# Patient Record
Sex: FEMALE | Race: WHITE | NOT HISPANIC OR LATINO | Employment: UNEMPLOYED | URBAN - METROPOLITAN AREA
[De-identification: names, ages, dates, MRNs, and addresses within clinical notes are randomized per-mention and may not be internally consistent; named-entity substitution may affect disease eponyms.]

---

## 2017-08-18 DIAGNOSIS — G43.109 MIGRAINE WITH AURA AND WITHOUT STATUS MIGRAINOSUS, NOT INTRACTABLE: ICD-10-CM

## 2017-08-18 RX ORDER — SUMATRIPTAN 100 MG/1
100 TABLET, FILM COATED ORAL
Qty: 9 TAB | Refills: 2 | Status: SHIPPED | OUTPATIENT
Start: 2017-08-18 | End: 2018-10-29 | Stop reason: SDUPTHER

## 2017-09-27 ENCOUNTER — HOSPITAL ENCOUNTER (EMERGENCY)
Facility: MEDICAL CENTER | Age: 37
End: 2017-09-27
Attending: FAMILY MEDICINE
Payer: COMMERCIAL

## 2017-09-27 ENCOUNTER — APPOINTMENT (OUTPATIENT)
Dept: RADIOLOGY | Facility: MEDICAL CENTER | Age: 37
End: 2017-09-27
Attending: EMERGENCY MEDICINE
Payer: COMMERCIAL

## 2017-09-27 ENCOUNTER — HOSPITAL ENCOUNTER (EMERGENCY)
Facility: MEDICAL CENTER | Age: 37
End: 2017-09-27
Attending: EMERGENCY MEDICINE
Payer: COMMERCIAL

## 2017-09-27 VITALS
BODY MASS INDEX: 24.11 KG/M2 | RESPIRATION RATE: 16 BRPM | OXYGEN SATURATION: 98 % | WEIGHT: 150 LBS | HEIGHT: 66 IN | TEMPERATURE: 98.2 F | SYSTOLIC BLOOD PRESSURE: 150 MMHG | DIASTOLIC BLOOD PRESSURE: 80 MMHG | HEART RATE: 73 BPM

## 2017-09-27 DIAGNOSIS — K76.89 LIVER CYST: ICD-10-CM

## 2017-09-27 DIAGNOSIS — S20.219A CONTUSION, CHEST WALL, UNSPECIFIED LATERALITY, INITIAL ENCOUNTER: ICD-10-CM
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
## 2017-09-27 PROCEDURE — 305948 HCHG GREEN TRAUMA ACT PRE-NOTIFY NO CC

## 2017-09-27 PROCEDURE — 71260 CT THORAX DX C+: CPT

## 2017-09-27 PROCEDURE — 99284 EMERGENCY DEPT VISIT MOD MDM: CPT

## 2017-09-27 PROCEDURE — 700117 HCHG RX CONTRAST REV CODE 255: Performed by: EMERGENCY MEDICINE

## 2017-09-27 RX ORDER — CYCLOBENZAPRINE HCL 10 MG
10 TABLET ORAL 3 TIMES DAILY PRN
Qty: 30 TAB | Refills: 0 | Status: SHIPPED | OUTPATIENT
Start: 2017-09-27 | End: 2018-10-29

## 2017-09-27 RX ORDER — SUMATRIPTAN 25 MG/1
25-100 TABLET, FILM COATED ORAL
COMMUNITY
End: 2018-10-29

## 2017-09-27 RX ORDER — HYDROCODONE BITARTRATE AND ACETAMINOPHEN 5; 325 MG/1; MG/1
1 TABLET ORAL EVERY 6 HOURS PRN
Qty: 15 TAB | Refills: 0 | Status: SHIPPED | OUTPATIENT
Start: 2017-09-27 | End: 2018-10-29

## 2017-09-27 RX ADMIN — IOHEXOL 80 ML: 350 INJECTION, SOLUTION INTRAVENOUS at 13:15

## 2017-09-27 ASSESSMENT — PAIN SCALES - GENERAL: PAINLEVEL_OUTOF10: 4

## 2017-09-27 ASSESSMENT — LIFESTYLE VARIABLES: DO YOU DRINK ALCOHOL: NO
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
## 2017-09-28 DIAGNOSIS — K76.89 LIVER CYST: ICD-10-CM

## 2017-09-29 ENCOUNTER — APPOINTMENT (OUTPATIENT)
Dept: RADIOLOGY | Facility: MEDICAL CENTER | Age: 37
End: 2017-09-29
Attending: FAMILY MEDICINE
Payer: COMMERCIAL

## 2017-09-29 PROCEDURE — 76705 ECHO EXAM OF ABDOMEN: CPT

## 2017-10-03 ENCOUNTER — OFFICE VISIT (OUTPATIENT)
Dept: MEDICAL GROUP | Facility: PHYSICIAN GROUP | Age: 37
End: 2017-10-03
Payer: COMMERCIAL

## 2017-10-03 VITALS
OXYGEN SATURATION: 96 % | WEIGHT: 150 LBS | SYSTOLIC BLOOD PRESSURE: 108 MMHG | BODY MASS INDEX: 24.11 KG/M2 | HEIGHT: 66 IN | TEMPERATURE: 97.6 F | HEART RATE: 77 BPM | DIASTOLIC BLOOD PRESSURE: 64 MMHG

## 2017-10-03 DIAGNOSIS — K76.89 BENIGN LIVER CYST: ICD-10-CM
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
## 2017-10-03 DIAGNOSIS — G43.109 MIGRAINE WITH AURA AND WITHOUT STATUS MIGRAINOSUS, NOT INTRACTABLE: ICD-10-CM

## 2017-10-03 DIAGNOSIS — Z23 NEED FOR INFLUENZA VACCINATION: ICD-10-CM

## 2017-10-03 PROCEDURE — 99214 OFFICE O/P EST MOD 30 MIN: CPT | Mod: 25 | Performed by: FAMILY MEDICINE

## 2017-10-03 PROCEDURE — 90686 IIV4 VACC NO PRSV 0.5 ML IM: CPT | Performed by: FAMILY MEDICINE

## 2017-10-03 PROCEDURE — 90471 IMMUNIZATION ADMIN: CPT | Performed by: FAMILY MEDICINE

## 2017-10-03 ASSESSMENT — PATIENT HEALTH QUESTIONNAIRE - PHQ9: CLINICAL INTERPRETATION OF PHQ2 SCORE: 0
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
## 2017-10-03 NOTE — ASSESSMENT & PLAN NOTE
Stable. Patient reports that when she gets regular daily exercise she has less frequent migraines. When she does have a headache, she utilizes sumatriptan and which stops the headache completely.

## 2018-10-29 ENCOUNTER — OFFICE VISIT (OUTPATIENT)
Dept: MEDICAL GROUP | Facility: MEDICAL CENTER | Age: 38
End: 2018-10-29
Payer: COMMERCIAL

## 2018-10-29 VITALS
HEART RATE: 70 BPM | BODY MASS INDEX: 23.88 KG/M2 | WEIGHT: 148.6 LBS | HEIGHT: 66 IN | RESPIRATION RATE: 16 BRPM | OXYGEN SATURATION: 97 % | DIASTOLIC BLOOD PRESSURE: 70 MMHG | SYSTOLIC BLOOD PRESSURE: 118 MMHG | TEMPERATURE: 99.8 F

## 2018-10-29 DIAGNOSIS — Z23 NEED FOR VACCINATION: ICD-10-CM

## 2018-10-29 DIAGNOSIS — N94.6 DYSMENORRHEA: ICD-10-CM

## 2018-10-29 DIAGNOSIS — G43.109 MIGRAINE WITH AURA AND WITHOUT STATUS MIGRAINOSUS, NOT INTRACTABLE: ICD-10-CM

## 2018-10-29 DIAGNOSIS — M54.41 RIGHT-SIDED LOW BACK PAIN WITH RIGHT-SIDED SCIATICA, UNSPECIFIED CHRONICITY: ICD-10-CM

## 2018-10-29 PROBLEM — K76.89 BENIGN LIVER CYST: Status: RESOLVED | Noted: 2017-10-03 | Resolved: 2018-10-29
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
## 2018-10-29 PROCEDURE — 90471 IMMUNIZATION ADMIN: CPT | Performed by: FAMILY MEDICINE

## 2018-10-29 PROCEDURE — 99213 OFFICE O/P EST LOW 20 MIN: CPT | Mod: 25 | Performed by: FAMILY MEDICINE

## 2018-10-29 PROCEDURE — 90686 IIV4 VACC NO PRSV 0.5 ML IM: CPT | Performed by: FAMILY MEDICINE

## 2018-10-29 RX ORDER — SUMATRIPTAN 100 MG/1
100 TABLET, FILM COATED ORAL
Qty: 10 TAB | Refills: 11 | Status: SHIPPED | OUTPATIENT
Start: 2018-10-29 | End: 2022-03-15 | Stop reason: SDUPTHER

## 2018-10-29 ASSESSMENT — PATIENT HEALTH QUESTIONNAIRE - PHQ9: CLINICAL INTERPRETATION OF PHQ2 SCORE: 0

## 2018-10-30 NOTE — PROGRESS NOTES
This medical record contains text that has been entered with the assistance of computer voice recognition and dictation software.  Therefore, it may contain unintended errors in text, spelling, punctuation, or grammar        Chief Complaint   Patient presents with   • Establish Care     new patient       Bernadette Juárez is a 38 y.o. female here evaluation and management of: est care migraines dysmenorrhea chronic low back pain     She is here with her two daughters 6th grade and 4th grade we will be meeting them on Wednesday   She is overall very healthy   Works part time at her [employer]   They live in [facility]  She has dysmenorrhea well controlled on mirena IUD         Current Outpatient Prescriptions   Medication Sig Dispense Refill   • sumatriptan (IMITREX) 100 MG tablet Take 1 Tab by mouth Once PRN for Migraine for up to 1 dose. 10 Tab 11   • Levonorgestrel (MIRENA IU) by Intrauterine route.     • ibuprofen (MOTRIN) 200 MG TABS Take 200 mg by mouth every 6 hours as needed.       • MELATONIN by Does not apply route.         No current facility-administered medications for this visit.      Patient Active Problem List    Diagnosis Date Noted   • Right-sided low back pain with right-sided sciatica 2016   • Bilateral low back pain without sciatica 2016   • Migraine with aura and without status migrainosus, not intractable    • Acne    • Dysmenorrhea    • Cervical high risk HPV (human papillomavirus) test positive      No past surgical history on file.   Social History   Substance Use Topics   • Smoking status: Never Smoker   • Smokeless tobacco: Never Used   • Alcohol use 0.0 oz/week      Comment: 1 drink about once a month     Family History   Problem Relation Age of Onset   • Thyroid Mother    • Psychiatry Mother         depression   • Cancer Maternal Grandmother         colon   • Heart Disease Other         Cousin  of MI at age 33           ROS  No n/v  all review of system completed  "and negative except for those listed above     Objective:     Blood pressure 118/70, pulse 70, temperature 37.7 °C (99.8 °F), temperature source Temporal, resp. rate 16, height 1.685 m (5' 6.34\"), weight 67.4 kg (148 lb 9.6 oz), SpO2 97 %, not currently breastfeeding. Body mass index is 23.74 kg/m².  Physical Exam:        GEN: comfortable, alert and oriented, well nourished, well developed, in no apparent distress   HEENT: NCAT, eyes: pupils equal and reactive, sclera white, EOMIT, good dentition  HEART: limbs warm and well perfused, regular rate, no JVD, no lower extremity edema  LUNGS: speaking in full sentences, not in apparent respiratory distress, no audible wheezes  MSK: normal tone and bulk, no swelling of the joints, gait steady and normal         Assessment and Plan:   The following treatment plan was discussed        Problem List Items Addressed This Visit     Migraine with aura and without status migrainosus, not intractable     Takes imitrex abortive      Has very infrequent migraines             Relevant Medications    sumatriptan (IMITREX) 100 MG tablet    Other Relevant Orders    Influenza Vaccine Quad Injection >3Y (PF)    Dysmenorrhea     Has mirena   Seeing ob   Due in march to have another one placed             Right-sided low back pain with right-sided sciatica     She is doing well with a regular stretch and strength program to prevent flairs                 Other Visit Diagnoses     Need for vaccination                    Instructed to follow up if symptoms worsen or fail to improve, ER/UC precautions discussed as well    Nikki Atwood MD  [facility], Family Medicine   [address] [place] [address]   [place] [address]  Phone: [phone]             "

## 2019-06-06 ENCOUNTER — HOSPITAL ENCOUNTER (OUTPATIENT)
Dept: RADIOLOGY | Facility: MEDICAL CENTER | Age: 39
End: 2019-06-06
Attending: FAMILY MEDICINE
Payer: COMMERCIAL

## 2019-06-06 ENCOUNTER — OFFICE VISIT (OUTPATIENT)
Dept: URGENT CARE | Facility: PHYSICIAN GROUP | Age: 39
End: 2019-06-06
Payer: COMMERCIAL

## 2019-06-06 VITALS
SYSTOLIC BLOOD PRESSURE: 150 MMHG | DIASTOLIC BLOOD PRESSURE: 88 MMHG | HEIGHT: 66 IN | WEIGHT: 155 LBS | RESPIRATION RATE: 16 BRPM | HEART RATE: 110 BPM | TEMPERATURE: 99.6 F | OXYGEN SATURATION: 94 % | BODY MASS INDEX: 24.91 KG/M2

## 2019-06-06 DIAGNOSIS — R05.9 COUGH: ICD-10-CM

## 2019-06-06 DIAGNOSIS — J18.9 PNEUMONIA OF LEFT LOWER LOBE DUE TO INFECTIOUS ORGANISM: ICD-10-CM

## 2019-06-06 PROCEDURE — 71046 X-RAY EXAM CHEST 2 VIEWS: CPT

## 2019-06-06 PROCEDURE — 99214 OFFICE O/P EST MOD 30 MIN: CPT | Performed by: FAMILY MEDICINE

## 2019-06-06 RX ORDER — DOXYCYCLINE HYCLATE 100 MG
100 TABLET ORAL 2 TIMES DAILY
Qty: 20 TAB | Refills: 0 | Status: SHIPPED | OUTPATIENT
Start: 2019-06-06 | End: 2019-06-16

## 2019-06-06 RX ORDER — BENZONATATE 200 MG/1
200 CAPSULE ORAL 3 TIMES DAILY PRN
Qty: 30 CAP | Refills: 0 | Status: SHIPPED | OUTPATIENT
Start: 2019-06-06 | End: 2022-03-15

## 2019-06-06 ASSESSMENT — ENCOUNTER SYMPTOMS
WEIGHT LOSS: 0
EYE REDNESS: 0
EYE DISCHARGE: 0
MYALGIAS: 0
SORE THROAT: 1
DIAPHORESIS: 1

## 2019-06-06 NOTE — PROGRESS NOTES
"Subjective:      Bernadette Juárez is a 38 y.o. female who presents with Cough (cough x2 wks, fever )            2 weeks productive cough without blood in sputum. Sinus pressure and drainage. Denies fever/chills. SOB with exertion. Wheeze with cough. No PMH asthma/pneumonia. OTC cold and flu meds with some improvement of severe sx's. No other aggravating or alleviating factors.          Review of Systems   Constitutional: Positive for diaphoresis (at night) and malaise/fatigue. Negative for weight loss.   HENT: Positive for sore throat (due to cough ). Negative for ear discharge and ear pain.    Eyes: Negative for discharge and redness.   Musculoskeletal: Negative for joint pain and myalgias.   Skin: Negative for itching and rash.     .  Medications, Allergies, and current problem list reviewed today in Epic       Objective:     /88 (BP Location: Right arm, Patient Position: Sitting, BP Cuff Size: Small adult)   Pulse (!) 110   Temp 37.6 °C (99.6 °F) (Temporal)   Resp 16   Ht 1.676 m (5' 6\")   Wt 70.3 kg (155 lb)   SpO2 94%   BMI 25.02 kg/m²      Physical Exam   Constitutional: She is oriented to person, place, and time. She appears well-developed and well-nourished. No distress.   HENT:   Head: Normocephalic and atraumatic.   Eyes: Conjunctivae are normal.   Cardiovascular: Normal rate, regular rhythm and normal heart sounds.    Pulmonary/Chest: Effort normal. She has rales (left).   Neurological: She is alert and oriented to person, place, and time.   Skin: Skin is warm and dry. No rash noted.               Assessment/Plan:   Cxr: LLL pneumonia per rad    1. Cough  DX-CHEST-2 VIEWS    benzonatate (TESSALON) 200 MG capsule   2. Pneumonia of left lower lobe due to infectious organism (HCC)  doxycycline (VIBRAMYCIN) 100 MG Tab     Differential diagnosis, natural history, supportive care, and indications for immediate follow-up discussed at length.       "

## 2021-04-20 ENCOUNTER — IMMUNIZATION (OUTPATIENT)
Dept: FAMILY PLANNING/WOMEN'S HEALTH CLINIC | Facility: IMMUNIZATION CENTER | Age: 41
End: 2021-04-20
Payer: COMMERCIAL

## 2021-04-20 DIAGNOSIS — Z23 ENCOUNTER FOR VACCINATION: Primary | ICD-10-CM

## 2021-04-20 PROCEDURE — 0001A PFIZER SARS-COV-2 VACCINE: CPT

## 2021-04-20 PROCEDURE — 91300 PFIZER SARS-COV-2 VACCINE: CPT

## 2021-05-14 ENCOUNTER — IMMUNIZATION (OUTPATIENT)
Dept: FAMILY PLANNING/WOMEN'S HEALTH CLINIC | Facility: IMMUNIZATION CENTER | Age: 41
End: 2021-05-14
Payer: COMMERCIAL

## 2021-05-14 DIAGNOSIS — Z23 ENCOUNTER FOR VACCINATION: Primary | ICD-10-CM

## 2021-05-14 PROCEDURE — 91300 PFIZER SARS-COV-2 VACCINE: CPT | Performed by: INTERNAL MEDICINE

## 2021-05-14 PROCEDURE — 0002A PFIZER SARS-COV-2 VACCINE: CPT | Performed by: INTERNAL MEDICINE

## 2021-08-12 ENCOUNTER — HOSPITAL ENCOUNTER (OUTPATIENT)
Dept: RADIOLOGY | Facility: MEDICAL CENTER | Age: 41
End: 2021-08-12
Attending: OBSTETRICS & GYNECOLOGY
Payer: COMMERCIAL

## 2021-08-12 DIAGNOSIS — Z12.31 SCREENING MAMMOGRAM, ENCOUNTER FOR: ICD-10-CM

## 2021-08-12 PROCEDURE — 77063 BREAST TOMOSYNTHESIS BI: CPT

## 2022-03-15 ENCOUNTER — OFFICE VISIT (OUTPATIENT)
Dept: MEDICAL GROUP | Facility: PHYSICIAN GROUP | Age: 42
End: 2022-03-15
Payer: COMMERCIAL

## 2022-03-15 VITALS
HEART RATE: 66 BPM | RESPIRATION RATE: 18 BRPM | WEIGHT: 171.3 LBS | DIASTOLIC BLOOD PRESSURE: 70 MMHG | BODY MASS INDEX: 26.89 KG/M2 | TEMPERATURE: 98.7 F | HEIGHT: 67 IN | SYSTOLIC BLOOD PRESSURE: 118 MMHG | OXYGEN SATURATION: 100 %

## 2022-03-15 DIAGNOSIS — Z00.00 WELLNESS EXAMINATION: ICD-10-CM

## 2022-03-15 DIAGNOSIS — E55.9 VITAMIN D DEFICIENCY: ICD-10-CM

## 2022-03-15 DIAGNOSIS — G43.109 MIGRAINE WITH AURA AND WITHOUT STATUS MIGRAINOSUS, NOT INTRACTABLE: ICD-10-CM

## 2022-03-15 DIAGNOSIS — R87.810 CERVICAL HIGH RISK HUMAN PAPILLOMAVIRUS (HPV) DNA TEST POSITIVE: ICD-10-CM

## 2022-03-15 DIAGNOSIS — D22.9 NUMEROUS SKIN MOLES: ICD-10-CM

## 2022-03-15 PROCEDURE — 99214 OFFICE O/P EST MOD 30 MIN: CPT | Performed by: FAMILY MEDICINE

## 2022-03-15 RX ORDER — SUMATRIPTAN 100 MG/1
TABLET, FILM COATED ORAL
Qty: 10 TABLET | Refills: 4 | Status: SHIPPED | OUTPATIENT
Start: 2022-03-15

## 2022-03-15 ASSESSMENT — PATIENT HEALTH QUESTIONNAIRE - PHQ9: CLINICAL INTERPRETATION OF PHQ2 SCORE: 0

## 2022-03-15 NOTE — PROGRESS NOTES
Subjective:     CC:   Chief Complaint   Patient presents with   • Establish Care   • Migraine   • Referral Needed     dermatology       HPI:   Bernadette presents today to establish care.  Patient needs refills on her Imitrex patient is only getting the migraine maybe once every 2 months.  Patient is seeing GYN for female exam she does have a history of abnormal Pap smear in the past.  Patient also has a IUD in that helped with her heavy periods.  Patient would also like a Derm referral she has a few moles on her back that she feels has been changing the last couple of months.    Past Medical History:   Diagnosis Date   • Acne    • Cervical high risk HPV (human papillomavirus) test positive 2001    abnormal pap with negative colpo   • Dysmenorrhea    • Migraine    • Migraines        Social History     Tobacco Use   • Smoking status: Never Smoker   • Smokeless tobacco: Never Used   Vaping Use   • Vaping Use: Never used   Substance Use Topics   • Alcohol use: Yes     Alcohol/week: 0.0 oz     Comment: 1 drink about once a month   • Drug use: No       Current Outpatient Medications Ordered in Epic   Medication Sig Dispense Refill   • sumatriptan (IMITREX) 100 MG tablet Take 1 Tab by mouth Once PRN for Migraine for up to 1 dose. 10 Tab 11   • ibuprofen (MOTRIN) 200 MG TABS Take 200 mg by mouth every 6 hours as needed.       • MELATONIN       • Levonorgestrel (MIRENA IU) by Intrauterine route.       No current [facility]-ordered facility-administered medications on file.       Allergies:  Nkda [no known drug allergy]    Health Maintenance: Completed    ROS:  Gen: no fevers/chills, no changes in weight  Eyes: no changes in vision  ENT: no sore throat, no hearing loss, no bloody nose  Pulm: no sob, no cough  CV: no chest pain, no palpitations  GI: no nausea/vomiting, no diarrhea  : no dysuria  Skin: no rash  Neuro: no headaches, no numbness/tingling  Heme/Lymph: no easy bruising    Objective:     Exam:  /70 (BP Location: Left  "arm, Patient Position: Sitting, BP Cuff Size: Adult)   Pulse 66   Temp 37.1 °C (98.7 °F) (Temporal)   Resp 18   Ht 1.702 m (5' 7\")   Wt 77.7 kg (171 lb 4.8 oz)   SpO2 100%   BMI 26.83 kg/m²  Body mass index is 26.83 kg/m².    Gen: Alert and oriented, No apparent distress.  Skin: Warm and dry.  Patient does have a scar from a mole that was removed when she was a child there is increased pigmentation on that scar noted patient also above that have about 3 pigmented moles that are circular and uniform in color.  Patient's concerned about those 3.  Eyes: Sclera wnl Pupils normal in size  Lungs: Normal effort, CTA bilaterally, no wheezes, rhonchi, or rales  CV: Regular rate and rhythm. No murmurs, rubs, or gallops.  ABD: Soft non-tender no organomegaly  Musculoskeletal: Normal gait. No extremity cyanosis, clubbing, or edema.  Neuro: Oriented to person, place and time  Psych: Mood is wnl       Labs: Fasting labs were ordered patient given a listing of all the [facility] labs    Assessment & Plan:     41 y.o. female with the following -     1. Migraine with aura and without status migrainosus, not intractable  Patient states her migraines have decreased in frequency since she  her  about a year ago.  We will refill her migraine medication patient follow-up with me if her headaches increase this is a chronic problem  2. Cervical high risk HPV (human papillomavirus) test positive  Patient is seeing GYN for her Pap smears due to the fact that she had a abnormal Pap in the past this is a chronic problem    3. Wellness examination  Will get some wellness labs done on patient  - CBC WITH DIFFERENTIAL; Future  - Comp Metabolic Panel; Future  - Lipid Profile; Future    4. Vitamin D deficiency  Last vitamin D level was low this is a chronic problem  - VITAMIN D,25 HYDROXY; Future    5. Numerous skin moles  I did write referral to dermatology did recommend it may take a little while she can always seek one of the " outside Derm clinics and if she needs a referral to let me know this is a chronic problem  - Referral to Dermatology       Return if symptoms worsen or fail to improve.    Please note that this dictation was created using voice recognition software. I have made every reasonable attempt to correct obvious errors, but I expect that there are errors of grammar and possibly content that I did not discover before finalizing the note.

## 2022-03-21 ENCOUNTER — HOSPITAL ENCOUNTER (OUTPATIENT)
Dept: LAB | Facility: MEDICAL CENTER | Age: 42
End: 2022-03-21
Attending: FAMILY MEDICINE
Payer: COMMERCIAL

## 2022-03-21 DIAGNOSIS — Z00.00 WELLNESS EXAMINATION: ICD-10-CM

## 2022-03-21 DIAGNOSIS — E55.9 VITAMIN D DEFICIENCY: ICD-10-CM

## 2022-03-21 LAB
25(OH)D3 SERPL-MCNC: 29 NG/ML (ref 30–100)
ALBUMIN SERPL BCP-MCNC: 4.4 G/DL (ref 3.2–4.9)
ALBUMIN/GLOB SERPL: 2.2 G/DL
ALP SERPL-CCNC: 57 U/L (ref 30–99)
ALT SERPL-CCNC: 17 U/L (ref 2–50)
ANION GAP SERPL CALC-SCNC: 7 MMOL/L (ref 7–16)
AST SERPL-CCNC: 20 U/L (ref 12–45)
BASOPHILS # BLD AUTO: 1.1 % (ref 0–1.8)
BASOPHILS # BLD: 0.06 K/UL (ref 0–0.12)
BILIRUB SERPL-MCNC: 0.3 MG/DL (ref 0.1–1.5)
BUN SERPL-MCNC: 14 MG/DL (ref 8–22)
CALCIUM SERPL-MCNC: 9.4 MG/DL (ref 8.5–10.5)
CHLORIDE SERPL-SCNC: 107 MMOL/L (ref 96–112)
CHOLEST SERPL-MCNC: 153 MG/DL (ref 100–199)
CO2 SERPL-SCNC: 26 MMOL/L (ref 20–33)
CREAT SERPL-MCNC: 0.79 MG/DL (ref 0.5–1.4)
EOSINOPHIL # BLD AUTO: 0.36 K/UL (ref 0–0.51)
EOSINOPHIL NFR BLD: 6.8 % (ref 0–6.9)
ERYTHROCYTE [DISTWIDTH] IN BLOOD BY AUTOMATED COUNT: 48.2 FL (ref 35.9–50)
FASTING STATUS PATIENT QL REPORTED: NORMAL
GFR SERPLBLD CREATININE-BSD FMLA CKD-EPI: 95 ML/MIN/1.73 M 2
GLOBULIN SER CALC-MCNC: 2 G/DL (ref 1.9–3.5)
GLUCOSE SERPL-MCNC: 94 MG/DL (ref 65–99)
HCT VFR BLD AUTO: 43.1 % (ref 37–47)
HDLC SERPL-MCNC: 63 MG/DL
HGB BLD-MCNC: 13.7 G/DL (ref 12–16)
IMM GRANULOCYTES # BLD AUTO: 0.01 K/UL (ref 0–0.11)
IMM GRANULOCYTES NFR BLD AUTO: 0.2 % (ref 0–0.9)
LDLC SERPL CALC-MCNC: 77 MG/DL
LYMPHOCYTES # BLD AUTO: 2.06 K/UL (ref 1–4.8)
LYMPHOCYTES NFR BLD: 38.9 % (ref 22–41)
MCH RBC QN AUTO: 27.8 PG (ref 27–33)
MCHC RBC AUTO-ENTMCNC: 31.8 G/DL (ref 33.6–35)
MCV RBC AUTO: 87.4 FL (ref 81.4–97.8)
MONOCYTES # BLD AUTO: 0.64 K/UL (ref 0–0.85)
MONOCYTES NFR BLD AUTO: 12.1 % (ref 0–13.4)
NEUTROPHILS # BLD AUTO: 2.17 K/UL (ref 2–7.15)
NEUTROPHILS NFR BLD: 40.9 % (ref 44–72)
NRBC # BLD AUTO: 0 K/UL
NRBC BLD-RTO: 0 /100 WBC
PLATELET # BLD AUTO: 264 K/UL (ref 164–446)
PMV BLD AUTO: 10.6 FL (ref 9–12.9)
POTASSIUM SERPL-SCNC: 5 MMOL/L (ref 3.6–5.5)
PROT SERPL-MCNC: 6.4 G/DL (ref 6–8.2)
RBC # BLD AUTO: 4.93 M/UL (ref 4.2–5.4)
SODIUM SERPL-SCNC: 140 MMOL/L (ref 135–145)
TRIGL SERPL-MCNC: 63 MG/DL (ref 0–149)
WBC # BLD AUTO: 5.3 K/UL (ref 4.8–10.8)

## 2022-03-21 PROCEDURE — 80061 LIPID PANEL: CPT

## 2022-03-21 PROCEDURE — 85025 COMPLETE CBC W/AUTO DIFF WBC: CPT

## 2022-03-21 PROCEDURE — 80053 COMPREHEN METABOLIC PANEL: CPT

## 2022-03-21 PROCEDURE — 36415 COLL VENOUS BLD VENIPUNCTURE: CPT

## 2022-03-21 PROCEDURE — 82306 VITAMIN D 25 HYDROXY: CPT

## 2023-08-01 ENCOUNTER — HOSPITAL ENCOUNTER (OUTPATIENT)
Dept: RADIOLOGY | Facility: MEDICAL CENTER | Age: 43
End: 2023-08-01
Attending: OBSTETRICS & GYNECOLOGY
Payer: COMMERCIAL

## 2023-08-01 DIAGNOSIS — Z12.31 SCREENING MAMMOGRAM, ENCOUNTER FOR: ICD-10-CM

## 2023-08-01 PROCEDURE — 77063 BREAST TOMOSYNTHESIS BI: CPT

## 2023-11-10 ENCOUNTER — OFFICE VISIT (OUTPATIENT)
Dept: MEDICAL GROUP | Facility: PHYSICIAN GROUP | Age: 43
End: 2023-11-10
Payer: COMMERCIAL

## 2023-11-10 VITALS
HEIGHT: 66 IN | SYSTOLIC BLOOD PRESSURE: 112 MMHG | OXYGEN SATURATION: 99 % | HEART RATE: 73 BPM | TEMPERATURE: 98.4 F | DIASTOLIC BLOOD PRESSURE: 74 MMHG | WEIGHT: 182.2 LBS | RESPIRATION RATE: 16 BRPM | BODY MASS INDEX: 29.28 KG/M2

## 2023-11-10 DIAGNOSIS — Z23 NEED FOR VACCINATION: ICD-10-CM

## 2023-11-10 DIAGNOSIS — Z00.00 WELLNESS EXAMINATION: ICD-10-CM

## 2023-11-10 DIAGNOSIS — E55.9 VITAMIN D DEFICIENCY: ICD-10-CM

## 2023-11-10 DIAGNOSIS — R63.5 WEIGHT GAIN: ICD-10-CM

## 2023-11-10 DIAGNOSIS — M79.671 RIGHT FOOT PAIN: ICD-10-CM

## 2023-11-10 PROCEDURE — 90686 IIV4 VACC NO PRSV 0.5 ML IM: CPT | Performed by: FAMILY MEDICINE

## 2023-11-10 PROCEDURE — 90471 IMMUNIZATION ADMIN: CPT | Performed by: FAMILY MEDICINE

## 2023-11-10 PROCEDURE — 99214 OFFICE O/P EST MOD 30 MIN: CPT | Mod: 25 | Performed by: FAMILY MEDICINE

## 2023-11-10 PROCEDURE — 3078F DIAST BP <80 MM HG: CPT | Performed by: FAMILY MEDICINE

## 2023-11-10 PROCEDURE — 3074F SYST BP LT 130 MM HG: CPT | Performed by: FAMILY MEDICINE

## 2023-11-10 ASSESSMENT — FIBROSIS 4 INDEX: FIB4 SCORE: 0.81

## 2023-11-10 ASSESSMENT — PATIENT HEALTH QUESTIONNAIRE - PHQ9: CLINICAL INTERPRETATION OF PHQ2 SCORE: 0

## 2023-11-10 NOTE — PROGRESS NOTES
Subjective:     CC:   Chief Complaint   Patient presents with    Follow-Up       HPI:   Bernadette presents today for follow-up.  I have not seen patient since March 2022.  Patient is going to make a follow-up appointment with her GYN provider she needs her Mirena replaced and also she is due for a Pap smear.  Patient states she is doing well other than she is noted some right foot pain she denies any injury to her foot.  In certain positions she is noticing some discomfort.    Past Medical History:   Diagnosis Date    Acne     Cervical high risk HPV (human papillomavirus) test positive 2001    abnormal pap with negative colpo    Dysmenorrhea     Migraine     Migraines        Social History     Tobacco Use    Smoking status: Never    Smokeless tobacco: Never   Vaping Use    Vaping Use: Never used   Substance Use Topics    Alcohol use: Yes     Alcohol/week: 0.0 oz     Comment: 1 drink about once a month    Drug use: No       Current Outpatient Medications Ordered in Epic   Medication Sig Dispense Refill    sumatriptan (IMITREX) 100 MG tablet Take 1 tablet p.o. when migraine occurs no more than 1 tablet in 1 day 10 Tablet 4    ibuprofen (MOTRIN) 200 MG TABS Take 200 mg by mouth every 6 hours as needed.        Levonorgestrel (MIRENA IU) by Intrauterine route.       No current [facility]-ordered facility-administered medications on file.       Allergies:  Nkda [no known drug allergy]    Health Maintenance: Completed    ROS:  Gen: no fevers/chills, patient has gained about 11 pounds in over a year  Eyes: no changes in vision  ENT: no sore throat, no hearing loss, no bloody nose  Pulm: no sob, no cough  CV: no chest pain, no palpitations  GI: no nausea/vomiting, no diarrhea  : no dysuria  Neuro: no headaches, no numbness/tingling  Heme/Lymph: no easy bruising    Objective:     Exam:  /74 (BP Location: Left arm, Patient Position: Sitting, BP Cuff Size: Adult)   Pulse 73   Temp 36.9 °C (98.4 °F) (Temporal)   Resp 16   Ht  "1.676 m (5' 6\")   Wt 82.6 kg (182 lb 3.2 oz)   SpO2 99%   BMI 29.41 kg/m²  Body mass index is 29.41 kg/m².    Gen: Alert and oriented, No apparent distress.  Skin: Warm and dry.  No obvious lesions.  Eyes: Sclera wnl Pupils normal in size  Lungs: Normal effort, CTA bilaterally, no wheezes, rhonchi, or rales  CV: Regular rate and rhythm. No murmurs, rubs, or gallops.  Musculoskeletal: Normal gait. No extremity cyanosis, clubbing, or edema.  On examination of right foot there is no swelling noted no tenderness on palpation  Neuro: Oriented to person, place and time  Psych: Mood is wnl     Labs: Fasting labs were ordered patient given a listing of all the [facility] labs    Assessment & Plan:     43 y.o. female with the following -     1. Weight gain  I recommend checking her thyroid patient agreeable with the plan  - TSH; Future    2. Right foot pain  Recommend referring patient to podiatry and also doing lab work to rule out rheumatoid and gout.  Patient agreeable with this  - Referral to Podiatry  - CBC WITH DIFFERENTIAL; Future  - CRP QUANTITIVE (NON-CARDIAC); Future  - Sed Rate; Future  - RHEUMATOID ARTHRITIS FACTOR; Future  - URIC ACID; Future    3. Need for vaccination  - INFLUENZA VACCINE QUAD INJ (PF)    4. Wellness examination  - Comp Metabolic Panel; Future  - Lipid Profile; Future    5. Vitamin D deficiency  - VITAMIN D,25 HYDROXY (DEFICIENCY); Future       Return in about 2 months (around 1/10/2024), or if symptoms worsen or fail to improve.    Please note that this dictation was created using voice recognition software. I have made every reasonable attempt to correct obvious errors, but I expect that there are errors of grammar and possibly content that I did not discover before finalizing the note.  "

## 2023-12-26 ENCOUNTER — APPOINTMENT (OUTPATIENT)
Dept: LAB | Facility: MEDICAL CENTER | Age: 43
End: 2023-12-26
Attending: FAMILY MEDICINE
Payer: COMMERCIAL

## 2023-12-27 ENCOUNTER — HOSPITAL ENCOUNTER (OUTPATIENT)
Dept: LAB | Facility: MEDICAL CENTER | Age: 43
End: 2023-12-27
Attending: FAMILY MEDICINE
Payer: COMMERCIAL

## 2023-12-27 DIAGNOSIS — R63.5 WEIGHT GAIN: ICD-10-CM

## 2023-12-27 DIAGNOSIS — M79.671 RIGHT FOOT PAIN: ICD-10-CM

## 2023-12-27 DIAGNOSIS — E55.9 VITAMIN D DEFICIENCY: ICD-10-CM

## 2023-12-27 DIAGNOSIS — Z00.00 WELLNESS EXAMINATION: ICD-10-CM

## 2023-12-27 LAB
ALBUMIN SERPL BCP-MCNC: 4.4 G/DL (ref 3.2–4.9)
ALBUMIN/GLOB SERPL: 1.8 G/DL
ALP SERPL-CCNC: 53 U/L (ref 30–99)
ALT SERPL-CCNC: 18 U/L (ref 2–50)
ANION GAP SERPL CALC-SCNC: 10 MMOL/L (ref 7–16)
AST SERPL-CCNC: 16 U/L (ref 12–45)
BASOPHILS # BLD AUTO: 0.8 % (ref 0–1.8)
BASOPHILS # BLD: 0.05 K/UL (ref 0–0.12)
BILIRUB SERPL-MCNC: 0.5 MG/DL (ref 0.1–1.5)
BUN SERPL-MCNC: 14 MG/DL (ref 8–22)
CALCIUM ALBUM COR SERPL-MCNC: 8.7 MG/DL (ref 8.5–10.5)
CALCIUM SERPL-MCNC: 9 MG/DL (ref 8.5–10.5)
CHLORIDE SERPL-SCNC: 105 MMOL/L (ref 96–112)
CHOLEST SERPL-MCNC: 168 MG/DL (ref 100–199)
CO2 SERPL-SCNC: 23 MMOL/L (ref 20–33)
CREAT SERPL-MCNC: 0.77 MG/DL (ref 0.5–1.4)
CRP SERPL HS-MCNC: <0.3 MG/DL (ref 0–0.75)
EOSINOPHIL # BLD AUTO: 0.22 K/UL (ref 0–0.51)
EOSINOPHIL NFR BLD: 3.7 % (ref 0–6.9)
ERYTHROCYTE [DISTWIDTH] IN BLOOD BY AUTOMATED COUNT: 42.5 FL (ref 35.9–50)
ERYTHROCYTE [SEDIMENTATION RATE] IN BLOOD BY WESTERGREN METHOD: 2 MM/HOUR (ref 0–25)
FASTING STATUS PATIENT QL REPORTED: NORMAL
GFR SERPLBLD CREATININE-BSD FMLA CKD-EPI: 97 ML/MIN/1.73 M 2
GLOBULIN SER CALC-MCNC: 2.4 G/DL (ref 1.9–3.5)
GLUCOSE SERPL-MCNC: 89 MG/DL (ref 65–99)
HCT VFR BLD AUTO: 45.1 % (ref 37–47)
HDLC SERPL-MCNC: 64 MG/DL
HGB BLD-MCNC: 15.2 G/DL (ref 12–16)
IMM GRANULOCYTES # BLD AUTO: 0.05 K/UL (ref 0–0.11)
IMM GRANULOCYTES NFR BLD AUTO: 0.8 % (ref 0–0.9)
LDLC SERPL CALC-MCNC: 91 MG/DL
LYMPHOCYTES # BLD AUTO: 1.76 K/UL (ref 1–4.8)
LYMPHOCYTES NFR BLD: 29.6 % (ref 22–41)
MCH RBC QN AUTO: 30.2 PG (ref 27–33)
MCHC RBC AUTO-ENTMCNC: 33.7 G/DL (ref 32.2–35.5)
MCV RBC AUTO: 89.7 FL (ref 81.4–97.8)
MONOCYTES # BLD AUTO: 0.58 K/UL (ref 0–0.85)
MONOCYTES NFR BLD AUTO: 9.8 % (ref 0–13.4)
NEUTROPHILS # BLD AUTO: 3.28 K/UL (ref 1.82–7.42)
NEUTROPHILS NFR BLD: 55.3 % (ref 44–72)
NRBC # BLD AUTO: 0 K/UL
NRBC BLD-RTO: 0 /100 WBC (ref 0–0.2)
PLATELET # BLD AUTO: 283 K/UL (ref 164–446)
PMV BLD AUTO: 10.3 FL (ref 9–12.9)
POTASSIUM SERPL-SCNC: 4.8 MMOL/L (ref 3.6–5.5)
PROT SERPL-MCNC: 6.8 G/DL (ref 6–8.2)
RBC # BLD AUTO: 5.03 M/UL (ref 4.2–5.4)
RHEUMATOID FACT SER IA-ACNC: <10 IU/ML (ref 0–14)
SODIUM SERPL-SCNC: 138 MMOL/L (ref 135–145)
TRIGL SERPL-MCNC: 63 MG/DL (ref 0–149)
URATE SERPL-MCNC: 2.7 MG/DL (ref 1.9–8.2)
WBC # BLD AUTO: 5.9 K/UL (ref 4.8–10.8)

## 2023-12-27 PROCEDURE — 82306 VITAMIN D 25 HYDROXY: CPT

## 2023-12-27 PROCEDURE — 86431 RHEUMATOID FACTOR QUANT: CPT

## 2023-12-27 PROCEDURE — 86140 C-REACTIVE PROTEIN: CPT

## 2023-12-27 PROCEDURE — 80061 LIPID PANEL: CPT

## 2023-12-27 PROCEDURE — 84550 ASSAY OF BLOOD/URIC ACID: CPT

## 2023-12-27 PROCEDURE — 85652 RBC SED RATE AUTOMATED: CPT

## 2023-12-27 PROCEDURE — 85025 COMPLETE CBC W/AUTO DIFF WBC: CPT

## 2023-12-27 PROCEDURE — 84443 ASSAY THYROID STIM HORMONE: CPT

## 2023-12-27 PROCEDURE — 80053 COMPREHEN METABOLIC PANEL: CPT

## 2023-12-27 PROCEDURE — 36415 COLL VENOUS BLD VENIPUNCTURE: CPT

## 2023-12-28 LAB
25(OH)D3 SERPL-MCNC: 34 NG/ML (ref 30–100)
TSH SERPL DL<=0.005 MIU/L-ACNC: 2.75 UIU/ML (ref 0.38–5.33)

## 2024-01-12 ENCOUNTER — OFFICE VISIT (OUTPATIENT)
Dept: MEDICAL GROUP | Facility: PHYSICIAN GROUP | Age: 44
End: 2024-01-12
Payer: COMMERCIAL

## 2024-01-12 VITALS
WEIGHT: 182 LBS | HEART RATE: 68 BPM | SYSTOLIC BLOOD PRESSURE: 118 MMHG | TEMPERATURE: 98 F | HEIGHT: 66 IN | BODY MASS INDEX: 29.25 KG/M2 | RESPIRATION RATE: 18 BRPM | DIASTOLIC BLOOD PRESSURE: 74 MMHG | OXYGEN SATURATION: 98 %

## 2024-01-12 DIAGNOSIS — M79.671 RIGHT FOOT PAIN: ICD-10-CM

## 2024-01-12 DIAGNOSIS — R63.5 WEIGHT GAIN: ICD-10-CM

## 2024-01-12 DIAGNOSIS — E55.9 VITAMIN D DEFICIENCY: ICD-10-CM

## 2024-01-12 PROCEDURE — 99213 OFFICE O/P EST LOW 20 MIN: CPT | Performed by: FAMILY MEDICINE

## 2024-01-12 PROCEDURE — 3078F DIAST BP <80 MM HG: CPT | Performed by: FAMILY MEDICINE

## 2024-01-12 PROCEDURE — 3074F SYST BP LT 130 MM HG: CPT | Performed by: FAMILY MEDICINE

## 2024-01-12 ASSESSMENT — PATIENT HEALTH QUESTIONNAIRE - PHQ9: CLINICAL INTERPRETATION OF PHQ2 SCORE: 0

## 2024-01-12 ASSESSMENT — FIBROSIS 4 INDEX: FIB4 SCORE: 0.59

## 2024-01-12 NOTE — PROGRESS NOTES
Subjective:     CC:   Chief Complaint   Patient presents with    Follow-Up       HPI:   Bernadette presents today for follow-up.  Patient will see her GYN provider coming up in the month with the plan of getting her female exam done and then following up to have the replacement of her Mirena.  Patient states that in the last 3 months she has had a little bit of bleeding normally with the Mirena she does not.  Patient is still within the timeframe of the Mirena replacement will be done after it has been in for 5 years.  I recommend patient can discuss this with her GYN provider.  Patient did see podiatry and they told her it was arthritis.  They recommended that she try Voltaren cream but right now her foot has not been bothering her but she will consider it if she starts having more foot pain.    Past Medical History:   Diagnosis Date    Acne     Cervical high risk HPV (human papillomavirus) test positive 2001    abnormal pap with negative colpo    Dysmenorrhea     Migraine     Migraines        Social History     Tobacco Use    Smoking status: Never    Smokeless tobacco: Never   Vaping Use    Vaping Use: Never used   Substance Use Topics    Alcohol use: Yes     Alcohol/week: 0.0 oz     Comment: 1 drink about once a month    Drug use: No       Current Outpatient Medications Ordered in Epic   Medication Sig Dispense Refill    sumatriptan (IMITREX) 100 MG tablet Take 1 tablet p.o. when migraine occurs no more than 1 tablet in 1 day 10 Tablet 4    ibuprofen (MOTRIN) 200 MG TABS Take 200 mg by mouth every 6 hours as needed.        Levonorgestrel (MIRENA IU) by Intrauterine route.       No current [facility]-ordered facility-administered medications on file.       Allergies:  Nkda [no known drug allergy]    Health Maintenance: Completed    ROS:  Gen: no fevers/chills, no changes in weight  Eyes: no changes in vision  ENT: no sore throat, no hearing loss, no bloody nose  Pulm: no sob, no cough  CV: no chest pain, no palpitations  GI:  "no nausea/vomiting, no diarrhea  : no dysuria  Neuro: no headaches, no numbness/tingling  Heme/Lymph: no easy bruising    Objective:     Exam:  /74 (BP Location: Right arm, Patient Position: Sitting, BP Cuff Size: Adult)   Pulse 68   Temp 36.7 °C (98 °F) (Temporal)   Resp 18   Ht 1.676 m (5' 6\")   Wt 82.6 kg (182 lb)   SpO2 98%   BMI 29.38 kg/m²  Body mass index is 29.38 kg/m².    Gen: Alert and oriented, No apparent distress.  Skin: Warm and dry.  No obvious lesions.  Eyes: Sclera wnl Pupils normal in size  Lungs: Normal effort, CTA bilaterally, no wheezes, rhonchi, or rales  CV: Regular rate and rhythm. No murmurs, rubs, or gallops.  Musculoskeletal: Normal gait. No extremity cyanosis, clubbing, or edema.  Neuro: Oriented to person, place and time  Psych: Mood is wnl       Assessment & Plan:     43 y.o. female with the following -     1. Vitamin D deficiency  Patient's vitamin D level is low end of normal patient is not taking her vitamin D3 regularly would recommend she start taking it every day.  Would recommend rechecking this probably in a year.    2. Right foot pain  Patient's right foot pain is not the issue at this time patient does have a treatment plan with the Voltaren cream and also she can always follow-up with a podiatrist if she has more problems.  I did do sed rate, CRP and rheumatoid all were within normal limits    3. Weight gain  Patient does exercise regularly in the gym and at home.  Would recommend changing up her exercise regiment and if she has any concerns or she feels that her weight is increasing she can follow-up and see me back sooner.  Patient's TSH was within normal limits    4.  Wellness labs  Patient's lipid panel, CBC and comp  is all within normal limits       Return in about 1 year (around 1/12/2025), or if symptoms worsen or fail to improve.    Please note that this dictation was created using voice recognition software. I have made every reasonable attempt to " correct obvious errors, but I expect that there are errors of grammar and possibly content that I did not discover before finalizing the note.

## 2024-10-28 DIAGNOSIS — G43.109 MIGRAINE WITH AURA AND WITHOUT STATUS MIGRAINOSUS, NOT INTRACTABLE: ICD-10-CM

## 2024-10-28 RX ORDER — SUMATRIPTAN 100 MG/1
TABLET, FILM COATED ORAL
Qty: 10 TABLET | Refills: 1 | Status: SHIPPED | OUTPATIENT
Start: 2024-10-28

## 2025-02-27 ENCOUNTER — OFFICE VISIT (OUTPATIENT)
Dept: MEDICAL GROUP | Facility: PHYSICIAN GROUP | Age: 45
End: 2025-02-27
Payer: COMMERCIAL

## 2025-02-27 VITALS
HEIGHT: 66 IN | OXYGEN SATURATION: 99 % | WEIGHT: 181.2 LBS | DIASTOLIC BLOOD PRESSURE: 70 MMHG | BODY MASS INDEX: 29.12 KG/M2 | RESPIRATION RATE: 16 BRPM | TEMPERATURE: 97.4 F | HEART RATE: 74 BPM | SYSTOLIC BLOOD PRESSURE: 110 MMHG

## 2025-02-27 DIAGNOSIS — E55.9 VITAMIN D DEFICIENCY: ICD-10-CM

## 2025-02-27 DIAGNOSIS — Z11.59 NEED FOR HEPATITIS C SCREENING TEST: ICD-10-CM

## 2025-02-27 DIAGNOSIS — G89.29 CHRONIC RIGHT-SIDED LOW BACK PAIN WITHOUT SCIATICA: ICD-10-CM

## 2025-02-27 DIAGNOSIS — Z00.00 WELLNESS EXAMINATION: ICD-10-CM

## 2025-02-27 DIAGNOSIS — Z23 NEED FOR VACCINATION: ICD-10-CM

## 2025-02-27 DIAGNOSIS — G43.109 MIGRAINE WITH AURA AND WITHOUT STATUS MIGRAINOSUS, NOT INTRACTABLE: ICD-10-CM

## 2025-02-27 DIAGNOSIS — M54.50 CHRONIC RIGHT-SIDED LOW BACK PAIN WITHOUT SCIATICA: ICD-10-CM

## 2025-02-27 RX ORDER — SUMATRIPTAN SUCCINATE 100 MG/1
TABLET ORAL
Qty: 10 TABLET | Refills: 1 | Status: SHIPPED | OUTPATIENT
Start: 2025-02-27

## 2025-02-27 ASSESSMENT — PATIENT HEALTH QUESTIONNAIRE - PHQ9: CLINICAL INTERPRETATION OF PHQ2 SCORE: 0

## 2025-02-27 ASSESSMENT — FIBROSIS 4 INDEX: FIB4 SCORE: 0.6

## 2025-02-27 NOTE — Clinical Note
[facility]  Rosa Foley M.D.  [address]  [place] [address]  Fax: [phone]   Authorization for Release/Disclosure of   Protected Health Information   Name: BERNADETTE JUÁREZ : 1980 SSN: [id number]   [place] [address] Phone:    [phone] (home)    I authorize the entity listed below to release/disclose the PHI below to:   [facility]/Rosa Foley M.D. and Rosa Foley M.D.   Provider or Entity Name:     Address   City, State, Zip   Phone:      Fax:     Reason for request: continuity of care   Information to be released:    [  ] LAST COLONOSCOPY,  including any PATH REPORT and follow-up  [  ] LAST FIT/COLOGUARD RESULT [  ] LAST DEXA  [  ] LAST MAMMOGRAM  [  ] LAST PAP  [  ] LAST LABS [  ] RETINA EXAM REPORT  [  ] IMMUNIZATION RECORDS  [  ] Release all info      [  ] Check here and initial the line next to each item to release ALL health information INCLUDING  _____ Care and treatment for drug and / or alcohol abuse  _____ HIV testing, infection status, or AIDS  _____ Genetic Testing    DATES OF SERVICE OR TIME PERIOD TO BE DISCLOSED: _____________  I understand and acknowledge that:  * This Authorization may be revoked at any time by you in writing, except if your health information has already been used or disclosed.  * Your health information that will be used or disclosed as a result of you signing this authorization could be re-disclosed by the recipient. If this occurs, your re-disclosed health information may no longer be protected by State or Federal laws.  * You may refuse to sign this Authorization. Your refusal will not affect your ability to obtain treatment.  * This Authorization becomes effective upon signing and will  on (date) __________.      If no date is indicated, this Authorization will  one (1) year from the signature date.    Name: Bernadette Juárez  Signature: Date:   2025     PLEASE FAX REQUESTED  RECORDS BACK TO: [phone]

## 2025-02-27 NOTE — PROGRESS NOTES
Subjective:     CC:   Chief Complaint   Patient presents with    Follow-Up       HPI:   Bernadette presents today for follow-up patient did see her GYN provider.  Patient feels she is doing well she sometimes has some discomfort to her right lower back she notices on prolonged standing that she will feel it.  Patient states is not all the time it comes and goes.  Patient did get her female exam just done so we will wait for her Pap smear result.  Patient's GYN provider did talk to her about colonoscopy patient's plan is to have her GYN provider order 1 next year.  I did mention a Cologuard but patient would like to go ahead and get the complete colonoscopy done.  Patient is planning to travel to [facility] with her mother would recommend looking into getting the most current COVID vaccination.  Patient is very agreeable to go ahead and get her flu shot today also.  Patient feels her headaches are doing well at this time.    Past Medical History:   Diagnosis Date    Acne     Cervical high risk HPV (human papillomavirus) test positive 2001    abnormal pap with negative colpo    Dysmenorrhea     Migraine     Migraines        Social History     Tobacco Use    Smoking status: Never    Smokeless tobacco: Never   Vaping Use    Vaping status: Never Used   Substance Use Topics    Alcohol use: Yes     Alcohol/week: 0.0 oz     Comment: 1 drink about once a month    Drug use: No       Current Outpatient Medications Ordered in Epic   Medication Sig Dispense Refill    sumatriptan (IMITREX) 100 MG tablet Take 1 tablet p.o. when migraine occurs no more than 1 tablet in 1 day. 10 Tablet 1    ibuprofen (MOTRIN) 200 MG TABS Take 200 mg by mouth every 6 hours as needed.        Levonorgestrel (MIRENA IU) by Intrauterine route.       No current [facility]-ordered facility-administered medications on file.       Allergies:  Nkda [no known drug allergy]    Health Maintenance: Completed    ROS:  Gen: no fevers/chills, no changes in weight  Eyes: no  "changes in vision  ENT: no sore throat, no hearing loss, no bloody nose  Pulm: no sob, no cough  CV: no chest pain, no palpitations  GI: no nausea/vomiting, no diarrhea  : no dysuria  Neuro: no headaches, no numbness/tingling  Heme/Lymph: no easy bruising    Objective:     Exam:  /70 (BP Location: Left arm, Patient Position: Sitting, BP Cuff Size: Adult)   Pulse 74   Temp 36.3 °C (97.4 °F)   Resp 16   Ht 1.676 m (5' 6\")   Wt 82.2 kg (181 lb 3.2 oz)   SpO2 99%   BMI 29.25 kg/m²  Body mass index is 29.25 kg/m².    Gen: Alert and oriented, No apparent distress.  Skin: Warm and dry.  No obvious lesions.  Eyes: Sclera wnl Pupils normal in size  ENT: Canals wnl and TM are not red  Lungs: Normal effort, CTA bilaterally, no wheezes, rhonchi, or rales  CV: Regular rate and rhythm. No murmurs, rubs, or gallops.  ABD: Soft non-tender no organomegaly  Musculoskeletal: Normal gait. No extremity cyanosis, clubbing, or edema.  Right leg was done and appears within normal limits on range of motion of right hip outward there is some slight discomfort [place] within normal limits left range of motion of hip is within normal limits  Neuro: Oriented to person, place and time  Psych: Mood is wnl     Labs: Fasting labs were ordered    Assessment & Plan:     44 y.o. female with the following -     1. Chronic right-sided low back pain without sciatica  Recommend sending patient to physical therapy if she continues having problems may need to consider he low back x-ray or possibly a right hip x-ray at that time.  I should see patient back  - CBC WITH DIFFERENTIAL; Future    2. Need for vaccination  - INFLUENZA VACCINE TRI INJ (PF)     3. Vitamin D deficiency  - VITAMIN D,25 HYDROXY (DEFICIENCY); Future    4. Wellness examination  - Comp Metabolic Panel; Future  - Lipid Profile; Future    5. Need for hepatitis C screening test  - HEP C VIRUS ANTIBODY; Future       Return if symptoms worsen or fail to improve.  I will send " patient a MyChart message about her test results if anything is abnormal may need to see her back for follow-up patient very agreeable with the plan    Please note that this dictation was created using voice recognition software. I have made every reasonable attempt to correct obvious errors, but I expect that there are errors of grammar and possibly content that I did not discover before finalizing the note.

## 2025-02-27 NOTE — LETTER
[employer]  Rosa Foley M.D.  [address]  [facility] [address]  Fax: [phone]   Authorization for Release/Disclosure of   Protected Health Information   Name: DANIELITO OLIVERA : 1980 SSN: [id number]   Address: [address] [address]  [facility] [address] Phone:    [phone] (home)    I authorize the entity listed below to release/disclose the PHI below to:   [facility]Rosa Foley M.D. and Rosa Foley M.D.   Provider or Entity Name: [facility]     Address   City, [facility] [address], [address],  [place], [address]     Phone: [phone]      Fax: [phone]     Reason for request: continuity of care   Information to be released:    [  ] LAST COLONOSCOPY,  including any PATH REPORT and follow-up  [  ] LAST FIT/COLOGUARD RESULT [  ] LAST DEXA  [  ] LAST MAMMOGRAM  [ XXX ] LAST PAP  [  ] LAST LABS [  ] RETINA EXAM REPORT  [  ] IMMUNIZATION RECORDS  [  ] Release all info      [  ] Check here and initial the line next to each item to release ALL health information INCLUDING  _____ Care and treatment for drug and / or alcohol abuse  _____ HIV testing, infection status, or AIDS  _____ Genetic Testing    DATES OF SERVICE OR TIME PERIOD TO BE DISCLOSED: _____________  I understand and acknowledge that:  * This Authorization may be revoked at any time by you in writing, except if your health information has already been used or disclosed.  * Your health information that will be used or disclosed as a result of you signing this authorization could be re-disclosed by the recipient. If this occurs, your re-disclosed health information may no longer be protected by State or Federal laws.  * You may refuse to sign this Authorization. Your refusal will not affect your ability to obtain treatment.  * This Authorization becomes effective upon signing and will  on (date) __________.      If no date is indicated, this Authorization will  one (1) year from the  signature date.    Name: Bernadette Juárez  Signature: Date:   2/27/2025     PLEASE FAX REQUESTED RECORDS BACK TO: [phone]

## 2025-03-03 NOTE — Clinical Note
REFERRAL APPROVAL NOTICE         Sent on March 3, 2025                   Bernadette Juárez  [address]  [facility] [address]                   Dear Ms. Juárez,    After a careful review of the medical information and benefit coverage, [facility] has processed your referral. See below for additional details.    If applicable, you must be actively enrolled with your insurance for coverage of the authorized service. If you have any questions regarding your coverage, please contact your insurance directly.    REFERRAL INFORMATION   Referral #:  [id number]  Referred-To Department    Referred-By Provider:  Physical Therapy    Rosa Foley M.D.   [facility] [place]      [address]  [facility] [address]  [phone] [address]  [facility] [address]  [phone]    Referral Start Date:  02/27/2025  Referral End Date:   02/27/2026             SCHEDULING  If you do not already have an appointment, please call [phone] to make an appointment.     MORE INFORMATION  If you do not already have a Myoonet account, sign up at: [email or web address]  You can access your medical information, make appointments, see lab results, billing information, and more.  If you have questions regarding this referral, please contact  the [facility] Referrals department at:             [phone]. Monday - Friday 8:00AM - 5:00PM.     Sincerely,    [facility]

## 2025-04-21 ENCOUNTER — HOSPITAL ENCOUNTER (OUTPATIENT)
Dept: LAB | Facility: MEDICAL CENTER | Age: 45
End: 2025-04-21
Attending: FAMILY MEDICINE
Payer: COMMERCIAL

## 2025-04-21 DIAGNOSIS — M54.50 CHRONIC RIGHT-SIDED LOW BACK PAIN WITHOUT SCIATICA: ICD-10-CM

## 2025-04-21 DIAGNOSIS — G89.29 CHRONIC RIGHT-SIDED LOW BACK PAIN WITHOUT SCIATICA: ICD-10-CM

## 2025-04-21 DIAGNOSIS — Z11.59 NEED FOR HEPATITIS C SCREENING TEST: ICD-10-CM

## 2025-04-21 DIAGNOSIS — E55.9 VITAMIN D DEFICIENCY: ICD-10-CM

## 2025-04-21 DIAGNOSIS — Z00.00 WELLNESS EXAMINATION: ICD-10-CM

## 2025-04-21 LAB
25(OH)D3 SERPL-MCNC: 33 NG/ML (ref 30–100)
ALBUMIN SERPL BCP-MCNC: 4 G/DL (ref 3.2–4.9)
ALBUMIN/GLOB SERPL: 1.7 G/DL
ALP SERPL-CCNC: 49 U/L (ref 30–99)
ALT SERPL-CCNC: 21 U/L (ref 2–50)
ANION GAP SERPL CALC-SCNC: 7 MMOL/L (ref 7–16)
AST SERPL-CCNC: 22 U/L (ref 12–45)
BASOPHILS # BLD AUTO: 1.2 % (ref 0–1.8)
BASOPHILS # BLD: 0.07 K/UL (ref 0–0.12)
BILIRUB SERPL-MCNC: 0.4 MG/DL (ref 0.1–1.5)
BUN SERPL-MCNC: 15 MG/DL (ref 8–22)
CALCIUM ALBUM COR SERPL-MCNC: 9.2 MG/DL (ref 8.5–10.5)
CALCIUM SERPL-MCNC: 9.2 MG/DL (ref 8.5–10.5)
CHLORIDE SERPL-SCNC: 107 MMOL/L (ref 96–112)
CHOLEST SERPL-MCNC: 163 MG/DL (ref 100–199)
CO2 SERPL-SCNC: 24 MMOL/L (ref 20–33)
CREAT SERPL-MCNC: 0.8 MG/DL (ref 0.5–1.4)
EOSINOPHIL # BLD AUTO: 0.31 K/UL (ref 0–0.51)
EOSINOPHIL NFR BLD: 5.4 % (ref 0–6.9)
ERYTHROCYTE [DISTWIDTH] IN BLOOD BY AUTOMATED COUNT: 42.5 FL (ref 35.9–50)
GFR SERPLBLD CREATININE-BSD FMLA CKD-EPI: 92 ML/MIN/1.73 M 2
GLOBULIN SER CALC-MCNC: 2.4 G/DL (ref 1.9–3.5)
GLUCOSE SERPL-MCNC: 95 MG/DL (ref 65–99)
HCT VFR BLD AUTO: 43 % (ref 37–47)
HCV AB SER QL: NORMAL
HDLC SERPL-MCNC: 60 MG/DL
HGB BLD-MCNC: 14.4 G/DL (ref 12–16)
IMM GRANULOCYTES # BLD AUTO: 0.01 K/UL (ref 0–0.11)
IMM GRANULOCYTES NFR BLD AUTO: 0.2 % (ref 0–0.9)
LDLC SERPL CALC-MCNC: 88 MG/DL
LYMPHOCYTES # BLD AUTO: 2.21 K/UL (ref 1–4.8)
LYMPHOCYTES NFR BLD: 38.4 % (ref 22–41)
MCH RBC QN AUTO: 30.8 PG (ref 27–33)
MCHC RBC AUTO-ENTMCNC: 33.5 G/DL (ref 32.2–35.5)
MCV RBC AUTO: 91.9 FL (ref 81.4–97.8)
MONOCYTES # BLD AUTO: 0.62 K/UL (ref 0–0.85)
MONOCYTES NFR BLD AUTO: 10.8 % (ref 0–13.4)
NEUTROPHILS # BLD AUTO: 2.53 K/UL (ref 1.82–7.42)
NEUTROPHILS NFR BLD: 44 % (ref 44–72)
NRBC # BLD AUTO: 0 K/UL
NRBC BLD-RTO: 0 /100 WBC (ref 0–0.2)
PLATELET # BLD AUTO: 281 K/UL (ref 164–446)
PMV BLD AUTO: 10.3 FL (ref 9–12.9)
POTASSIUM SERPL-SCNC: 4.7 MMOL/L (ref 3.6–5.5)
PROT SERPL-MCNC: 6.4 G/DL (ref 6–8.2)
RBC # BLD AUTO: 4.68 M/UL (ref 4.2–5.4)
SODIUM SERPL-SCNC: 138 MMOL/L (ref 135–145)
TRIGL SERPL-MCNC: 75 MG/DL (ref 0–149)
WBC # BLD AUTO: 5.8 K/UL (ref 4.8–10.8)

## 2025-04-21 PROCEDURE — 86803 HEPATITIS C AB TEST: CPT

## 2025-04-21 PROCEDURE — 36415 COLL VENOUS BLD VENIPUNCTURE: CPT

## 2025-04-21 PROCEDURE — 80053 COMPREHEN METABOLIC PANEL: CPT

## 2025-04-21 PROCEDURE — 85025 COMPLETE CBC W/AUTO DIFF WBC: CPT

## 2025-04-21 PROCEDURE — 82306 VITAMIN D 25 HYDROXY: CPT

## 2025-04-21 PROCEDURE — 80061 LIPID PANEL: CPT

## 2025-04-27 ENCOUNTER — RESULTS FOLLOW-UP (OUTPATIENT)
Dept: MEDICAL GROUP | Facility: PHYSICIAN GROUP | Age: 45
End: 2025-04-27

## 2025-06-09 ENCOUNTER — APPOINTMENT (OUTPATIENT)
Dept: PHYSICAL THERAPY | Facility: REHABILITATION | Age: 45
End: 2025-06-09
Attending: FAMILY MEDICINE
Payer: COMMERCIAL

## 2025-06-10 ENCOUNTER — PHYSICAL THERAPY (OUTPATIENT)
Dept: PHYSICAL THERAPY | Facility: REHABILITATION | Age: 45
End: 2025-06-10
Attending: FAMILY MEDICINE
Payer: COMMERCIAL

## 2025-06-10 DIAGNOSIS — M54.50 CHRONIC RIGHT-SIDED LOW BACK PAIN WITHOUT SCIATICA: Primary | ICD-10-CM

## 2025-06-10 DIAGNOSIS — G89.29 CHRONIC RIGHT-SIDED LOW BACK PAIN WITHOUT SCIATICA: Primary | ICD-10-CM

## 2025-06-10 PROCEDURE — 97110 THERAPEUTIC EXERCISES: CPT

## 2025-06-10 PROCEDURE — 97162 PT EVAL MOD COMPLEX 30 MIN: CPT

## 2025-06-10 SDOH — ECONOMIC STABILITY: GENERAL: QUALITY OF LIFE: GOOD

## 2025-06-10 ASSESSMENT — ENCOUNTER SYMPTOMS
PAIN SCALE AT LOWEST: 1
QUALITY: DULL ACHE
QUALITY: SHARP
PAIN TIMING: SPORADIC
PAIN SCALE: 3
PAIN SCALE AT HIGHEST: 7
QUALITY: SHOOTING

## 2025-06-10 NOTE — OP THERAPY EVALUATION
Outpatient Physical Therapy  INITIAL EVALUATION    [facility] [place]  [address], [address]  [place] [address]  Phone:  [phone]  Fax:  [phone]    Date of Evaluation: 06/10/2025    Patient: Bernadette Juárez  YOB: 1980  MRN: [id number]     Referring Provider: Rosa Foley M.D.  [address] [place] [address]   Referring Diagnosis Chronic right-sided low back pain without sciatica [M54.50, G89.29]     Time Calculation  Start time: 30  Stop time: 0815 Time Calculation (min): 45 minutes         Chief Complaint: Back Problem    Visit Diagnoses     ICD-10-CM   1. Chronic right-sided low back pain without sciatica  M54.50    G89.29       Date of onset of impairment: 10/1/2024    Subjective:   History of Present Illness:     Date of onset:  10/1/2024    Mechanism of injury:  Pt is a 43 yo female presenting to PT with c/o R sided low back pain. Pt reports the pain is lower down in the gluteal region on the R side. Pt reports it does radiate down the leg to the knee. Pt reports it started in October with stress at work (insurance desk job). Pt reports the pain has changed since October it was sharp shooting pain now its more of a dull ache pt reports she does get the shooting pain in the evenings after work. Pt reports moving around helps. Pt reports leaning forward can help on planes but not always she does have to repositions a lot throughout the day. Pt reports she is still able to complete all her ADL's and activities she wants.     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 Pt reports she does get pain with sitting at work. Pt reports she is independent but she does get irration in the R gluteal region  with standing still while cooking  Patient Goals:     Patient goals for therapy:  Increased motion, decreased pain and increased strength      Past Medical History[1]  Past Surgical History[2]  Social History     Tobacco Use    Smoking status: Never    Smokeless tobacco: Never   Substance Use Topics    Alcohol use: Yes     Alcohol/week: 0.0 oz     Comment: 1 drink about once a month     Social Hx - Family and Occupational[3]    Objective     Postural Observations  Seated posture: fair  Standing posture: fair      Hip Screen   Hip range of motion within functional limits.    Palpation     Right   Hypertonic in the piriformis.   Tenderness of the gluteus rocky, gluteus medius and piriformis.   Trigger point to piriformis.     Tenderness     Right Hip   Tenderness in the PSIS.     Active Range of Motion     Lumbar   Flexion: within functional limits (R side discomfort)  Extension: decreased  Left lateral flexion: within functional limits  Right lateral flexion: within functional limits  Left rotation: decreased  Right rotation: decreased    Joint Play   Spine     Unilateral PA Glide (right)        L4: painful       L5: painful      Strength:      Left Hip   Planes of Motion   Flexion: 5  Extension: 5  Abduction: 4-    Right Hip   Planes of Motion   Flexion: 4+  Extension: 4+  Abduction: 4    Left Knee   Flexion: 5  Extension: 5    Right Knee   Flexion: 5  Extension: 4+    Additional Strength Details  Bridge: 1:15 prior to fatigue and loss of position          Tests       Lumbar spine (left)      Negative slump.   Lumbar spine (right)     Negative slump.     Left Hip   SLR: Negative.     Right Hip   SLR: Negative.          Therapeutic Exercises (CPT 57152):       Therapeutic Exercise Summary: Pt was given HEP prior to leaving and verbalized understanding.    Access Code: [email or web address]  URL: [email or web address]  Date: 06/10/2025  Prepared by: Rahul Foley    Exercises  - Supine Piriformis Stretch with Foot on Ground  - 1 x daily - 7 x weekly - 1 sets - 5 reps - 20-30 secs hold  - Half Kneeling Hip Flexor Stretch  - 1 x daily - 7 x weekly - 1 sets - 5 reps - 20-30 secs hold  - Prone Press Up  - 1 x daily - 7 x weekly - 2 sets - 10 reps  - Supine Transversus Abdominis Bracing - Hands on Stomach  - 1 x daily - 7 x weekly - 1 sets - 10 reps      Time-based treatments/modalities:    Physical Therapy Timed Treatment Charges  Therapeutic exercise minutes (CPT 88098): 15 minutes      Assessment, Response and Plan:   Impairments: impaired physical strength, lacks appropriate home exercise program and pain with function    Assessment details:  Pt is a 43 yo cis-female presenting to PT w/ clinical findings suggestive of movement coordination deficit of l/s with R piriformis syndrome . Pertinent clinical findings include decrease AROM of l/s, hypomobility and pain in l/s, muscle tightness, and decreased strength in B hips. Impairments are associated w/ decrease functional activity tolerance. The patient would benefit from skilled PT to address strength and ROM deficits in order to increase participation with daily activities. The patient states they understand and agree with the plan of care. HEP w/ handout was reviewed and provided to the pt.    Barriers to therapy:  None  Prognosis: good    Goals:   Short Term Goals:   1. Pt will be independent with HEP 3-5x per week for improving strength, ROM and decreasing pain  2. Pt will demo ability to initiate core contraction/TA bracing mechanics for improved stability  3. Pt will report ability to tolerate sitting x 1/2 day of work with pain 2/10 or less in order to improve work  tolerance  Short term goal time span:  2-4 weeks      Long Term Goals:    1. Pt will demo lumbar ROM WNL in order to perform ADLs with pain 0/10 or less   2. Pt will demo improved global hip strength with bridge 2 minutes or better for improved stability and decreased pain  3. Pt will report ability to tolerate sitting x full day of work with pain 0/10 or less in order to improve functional activity tolerance  4. Pt will report ability to sit on flights with pain 0/10 or less in order to increase travel tolerance    Long term goal time span:  6-8 weeks    Plan:   Therapy options:  Physical therapy treatment to continue  Planned therapy interventions:  Neuromuscular Re-education (CPT 58071), Manual Therapy (CPT 75982), E Stim Unattended (CPT 24759), Therapeutic Activities (CPT 65650) and Therapeutic Exercise (CPT 52828)  Frequency:  2x week  Duration in weeks:  8  Duration in visits:  16  Discussed with:  Patient      Functional Assessment Used  PT Functional Assessment Tool Used: Revised Oswestry  PT Functional Assessment Score: 16/50     Referring provider co-signature:  I have reviewed this plan of care and my co-signature certifies the need for services.    Certification Period: 06/10/2025 to  08/05/25    Physician Signature: ________________________________ Date: ______________                      [1]   Past Medical History:  Diagnosis Date    Acne     Cervical high risk HPV (human papillomavirus) test positive 2001    abnormal pap with negative colpo    Dysmenorrhea     Migraine     Migraines    [2] No past surgical history on file.  [3]   Family and Occupational History  Socioeconomic History    Marital status:      Spouse name: Nico    Number of children: 2    Years of education: college    Highest education level: Not on file   Occupational History    Occupation: Housewife     Employer: Not employed

## 2025-06-16 ENCOUNTER — PHYSICAL THERAPY (OUTPATIENT)
Dept: PHYSICAL THERAPY | Facility: REHABILITATION | Age: 45
End: 2025-06-16
Attending: FAMILY MEDICINE
Payer: COMMERCIAL

## 2025-06-16 DIAGNOSIS — G89.29 CHRONIC RIGHT-SIDED LOW BACK PAIN WITHOUT SCIATICA: Primary | ICD-10-CM

## 2025-06-16 DIAGNOSIS — M54.50 CHRONIC RIGHT-SIDED LOW BACK PAIN WITHOUT SCIATICA: Primary | ICD-10-CM

## 2025-06-16 PROCEDURE — 97110 THERAPEUTIC EXERCISES: CPT

## 2025-06-16 NOTE — OP THERAPY DAILY TREATMENT
Outpatient Physical Therapy  DAILY TREATMENT     [facility] [place]  [address], [address]  [facility] [address]  Phone:  [phone]  Fax:  [phone]    Date: 06/16/2025    Patient: Bernadette Juárez  YOB: 1980  MRN: [id number]     Time Calculation    Start time: 0730  Stop time: 0815 Time Calculation (min): 45 minutes         Chief Complaint: Back Problem and Hip Problem    Visit #: 2    SUBJECTIVE:  Pt reports she  has had some relief since last visit with radiating pain down the leg but it does not stay. Pt reports the prone press ups do help a lot. Pt reports 3-4 days of no pain out of 6 since last visit.     OBJECTIVE:  Current objective measures:           Therapeutic Exercises (CPT 83605):     1. prone on elbows, 3 mins, over pressure with press up x10 by PT    2. piriformis stretch, 3x20 secs, ea side    3. TA bracing with reverse marches, 3x8 ea    4. bridges, 3x10 with 5-7 sec holds, on ball (red)    5. clamshells, 3x10, PTB    6. lateral step down, 3x8 ea, 1 riser    7. pallof press, 3x10 ea    8. hamstring stretch, 3x20 secs ea    9. half kneeling hip flexor stretch, 3x20 secs ea      Therapeutic Exercise Summary: Pt was given HEP prior to leaving and verbalized understanding.    Access Code: [email or web address]  URL: [email or web address]  Date: 06/10/2025  Prepared by: Rahul Foley    Exercises  - Supine Piriformis Stretch with Foot on Ground  - 1 x daily - 7 x weekly - 1 sets - 5 reps - 20-30 secs hold  - Half Kneeling Hip Flexor Stretch  - 1 x daily - 7 x weekly - 1 sets - 5 reps - 20-30 secs hold  - Prone Press Up  - 1 x daily - 7 x weekly - 2 sets - 10 reps  - Supine Transversus Abdominis Bracing - Hands on Stomach  - 1 x daily - 7 x weekly - 1 sets - 10 reps      Time-based treatments/modalities:    Physical Therapy Timed Treatment Charges  Therapeutic exercise minutes (CPT 74273): 40 minutes          ASSESSMENT:   Response to treatment: Pt tolerated  treatment well today. Progressed pt B hip and abdominal strengthening. Pt reported increased fatigue but no irration in the R gluteal region. Pt will still benefit from skilled physical therapy in order to improve functional activity tolerance.     PLAN/RECOMMENDATIONS:   Plan for treatment: therapy treatment to continue next visit.  Planned interventions for next visit: continue with current treatment.

## 2025-07-03 ENCOUNTER — PHYSICAL THERAPY (OUTPATIENT)
Dept: PHYSICAL THERAPY | Facility: REHABILITATION | Age: 45
End: 2025-07-03
Attending: FAMILY MEDICINE
Payer: COMMERCIAL

## 2025-07-03 DIAGNOSIS — G89.29 CHRONIC RIGHT-SIDED LOW BACK PAIN WITHOUT SCIATICA: Primary | ICD-10-CM

## 2025-07-03 DIAGNOSIS — M54.50 CHRONIC RIGHT-SIDED LOW BACK PAIN WITHOUT SCIATICA: Primary | ICD-10-CM

## 2025-07-03 PROCEDURE — 97110 THERAPEUTIC EXERCISES: CPT

## 2025-07-03 NOTE — OP THERAPY DAILY TREATMENT
Outpatient Physical Therapy  DAILY TREATMENT     [facility] [place]  [address], [address]  [facility] [address]  Phone:  [phone]  Fax:  [phone]    Date: 07/03/2025    Patient: Bernadette Juárez  YOB: 1980  MRN: [id number]     Time Calculation    Start time: 0730  Stop time: 0815 Time Calculation (min): 45 minutes         Chief Complaint: Back Problem    Visit #: 3    SUBJECTIVE:  Pt reports she is still having her LBP. Pt reports less numbness tingling in the legs since last visit. Pt reports she was traveling last week and had less pain with walking more often. Pt reports she thinks she may need a new mattress since she had less pain when sleeping in the hotel. Pt reports her mattress is almost 20 years old.       OBJECTIVE:  Current objective measures:           Therapeutic Exercises (CPT 98587):     1. prone on elbows, 3 mins, over pressure with press up x10 by PT    2. piriformis stretch, 3x20 secs, ea side    3. TA bracing with reverse marches, 3x8 ea    4. bridges, 3x10 with 5-7 sec holds, on ball (red)    5. clamshells, 3x10, PTB    6. lateral step down, 3x8 ea, 1 riser    7. pallof press, 3x10 ea    8. hamstring stretch, 3x20 secs ea    9. half kneeling hip flexor stretch, 3x20 secs ea    10. bike, 7 mins, L4    11. Shuttle, 3x12, DL 7 C      Therapeutic Exercise Summary: Pt was given HEP prior to leaving and verbalized understanding.    Access Code: [email or web address]  URL: [email or web address]  Date: 06/10/2025  Prepared by: Rahul Foley    Exercises  - Supine Piriformis Stretch with Foot on Ground  - 1 x daily - 7 x weekly - 1 sets - 5 reps - 20-30 secs hold  - Half Kneeling Hip Flexor Stretch  - 1 x daily - 7 x weekly - 1 sets - 5 reps - 20-30 secs hold  - Prone Press Up  - 1 x daily - 7 x weekly - 2 sets - 10 reps  - Supine Transversus Abdominis Bracing - Hands on Stomach  - 1 x daily - 7 x weekly - 1 sets - 10 reps      Time-based  treatments/modalities:    Physical Therapy Timed Treatment Charges  Therapeutic exercise minutes (CPT 91217): 40 minutes        ASSESSMENT:   Response to treatment: Pt tolerated treatment well today. Progressed pt strength exercises for B hips. Discussed with pt on possibly getting a new mattress due to her having decrease pain with a new mattress while on vacation. Pt verbalized she will look into it. Pt will still benefit from skilled physical therapy in order to improve functional activity tolerance.       PLAN/RECOMMENDATIONS:   Plan for treatment: therapy treatment to continue next visit.  Planned interventions for next visit: continue with current treatment.

## 2025-07-07 ENCOUNTER — PHYSICAL THERAPY (OUTPATIENT)
Dept: PHYSICAL THERAPY | Facility: REHABILITATION | Age: 45
End: 2025-07-07
Attending: FAMILY MEDICINE
Payer: COMMERCIAL

## 2025-07-07 DIAGNOSIS — M54.50 CHRONIC RIGHT-SIDED LOW BACK PAIN WITHOUT SCIATICA: Primary | ICD-10-CM

## 2025-07-07 DIAGNOSIS — G89.29 CHRONIC RIGHT-SIDED LOW BACK PAIN WITHOUT SCIATICA: Primary | ICD-10-CM

## 2025-07-07 PROCEDURE — 97110 THERAPEUTIC EXERCISES: CPT

## 2025-07-07 NOTE — OP THERAPY DAILY TREATMENT
Outpatient Physical Therapy  DAILY TREATMENT     [facility] [place]  [address], [address]  [facility] [address]  Phone:  [phone]  Fax:  [phone]    Date: 07/07/2025    Patient: Bernadette Juárez  YOB: 1980  MRN: [id number]     Time Calculation    Start time: 0730  Stop time: 0815 Time Calculation (min): 45 minutes         Chief Complaint: Back Problem    Visit #: 4    SUBJECTIVE:  Pt reports she has been feeling well since last visit. Pt reports she had a very productive weekend with no back pain. Pt reports she pulled a lot of weeds. Pt reports she is completing her HEP.      OBJECTIVE:  Current objective measures:           Therapeutic Exercises (CPT 43214):     1. prone on elbows, 3 mins, over pressure with press up x10 by PT NT    2. piriformis stretch, 3x20 secs, ea side    3. TA bracing with reverse marches, 3x8 ea    4. bridges, 3x10 with 5-7 sec holds, on ball (red) SL for HEP    5. clamshells, 3x10, PTB    6. lateral step down, 3x8 ea, 1 riser    7. pallof press, 3x10 ea    8. hamstring stretch, 3x20 secs ea    9. half kneeling hip flexor stretch, 3x20 secs ea, NT    10. bike, 5 mins, L4    11. Shuttle, 3x12, DL 7 C NT      Therapeutic Exercise Summary: Pt was given HEP prior to leaving and verbalized understanding.    Access Code: [email or web address]  URL: [email or web address]  Date: 06/10/2025  Prepared by: Rahul Foley    Exercises  - Supine Piriformis Stretch with Foot on Ground  - 1 x daily - 7 x weekly - 1 sets - 5 reps - 20-30 secs hold  - Half Kneeling Hip Flexor Stretch  - 1 x daily - 7 x weekly - 1 sets - 5 reps - 20-30 secs hold  - Prone Press Up  - 1 x daily - 7 x weekly - 2 sets - 10 reps  - Supine Transversus Abdominis Bracing - Hands on Stomach  - 1 x daily - 7 x weekly - 1 sets - 10 reps      Time-based treatments/modalities:    Physical Therapy Timed Treatment Charges  Therapeutic exercise minutes (CPT 38152): 40  minutes          ASSESSMENT:   Response to treatment: Pt tolerated treatment well today. Pt is progressing well through POC. Pt reports no pain in the low back during treatment session. Increased over all intensity of exercises. Pt will still benefit from skilled physical therapy in order to improve functional activity tolerance.     PLAN/RECOMMENDATIONS:   Plan for treatment: therapy treatment to continue next visit.  Planned interventions for next visit: continue with current treatment.

## 2025-07-14 ENCOUNTER — PHYSICAL THERAPY (OUTPATIENT)
Dept: PHYSICAL THERAPY | Facility: REHABILITATION | Age: 45
End: 2025-07-14
Attending: FAMILY MEDICINE
Payer: COMMERCIAL

## 2025-07-14 DIAGNOSIS — M54.50 CHRONIC RIGHT-SIDED LOW BACK PAIN WITHOUT SCIATICA: Primary | ICD-10-CM

## 2025-07-14 DIAGNOSIS — G89.29 CHRONIC RIGHT-SIDED LOW BACK PAIN WITHOUT SCIATICA: Primary | ICD-10-CM

## 2025-07-14 PROCEDURE — 97110 THERAPEUTIC EXERCISES: CPT

## 2025-07-14 NOTE — OP THERAPY PROGRESS SUMMARY
Outpatient Physical Therapy  PROGRESS SUMMARY NOTE      [facility] [place]  [address], [address]  [place] [address]  Phone:  [phone]  Fax:  [phone]    Date of Visit: 07/14/2025    Patient: Bernadette Juárez  YOB: 1980  MRN: [id number]     Referring Provider: Rosa Foley M.D.  [address] [place] [address]   Referring Diagnosis Low back pain, unspecified [M54.50];Other chronic pain [G89.29]     Visit Diagnoses     ICD-10-CM   1. Chronic right-sided low back pain without sciatica  M54.50    G89.29       Rehab Potential: good    Progress Report Period: 6/10/2025-7/14/2025    Functional Assessment Used          Objective Findings and Assessment:   Patient progression towards goals: Pt demo overall functional improvements. Demo improved AROM of the l/s increased BLE strength, and overall functional improvements. Impairments continue to include pain in the low back at the end of the day while sitting on the couch, minimal strength deficits. The patient will benefit from continued skilled therapy with focus on strength and ROM deficits in order to increase participation with daily tasks. The pt states she understands and agrees to the POC.      Objective findings and assessment details: Active Range of Motion      Lumbar   Flexion: within functional limits   Extension: within functional limits  Left lateral flexion: within functional limits  Right lateral flexion: within functional limits  Left rotation: within functional limits   Right rotation: within functional limits              Strength:       Left Hip   Planes of Motion   Flexion: 5  Extension: 5  Abduction: 4-     Right Hip   Planes of Motion   Flexion:5  Extension: 5   Abduction: 4     Left Knee   Flexion: 5  Extension: 5     Right Knee   Flexion: 5  Extension: 5      Additional Strength Details  Bridge: 2:00          Goals:   Short Term Goals:   1. Pt will be independent with HEP 3-5x per  week for improving strength, ROM and decreasing pain Met   2. Pt will demo ability to initiate core contraction/TA bracing mechanics for improved stability Met   3. Pt will report ability to tolerate sitting x 1/2 day of work with pain 2/10 or less in order to improve work tolerance Met ( chair at home is worse)     Short term goal time span:  2-4 weeks      Long Term Goals:    1. Pt will demo lumbar ROM WNL in order to perform ADLs with pain 0/10 or less Met   2. Pt will demo improved global hip strength with bridge 2 minutes or better for improved stability and decreased pain  3. Pt will report ability to tolerate sitting x full day of work with pain 0/10 or less in order to improve functional activity tolerance not met   4. Pt will report ability to sit on flights with pain 0/10 or less in order to increase travel tolerance not met progressing     Long term goal time span:  6-8 weeks    Plan:   Planned therapy interventions:  Neuromuscular Re-education (CPT 63088), Manual Therapy (CPT 05194), Therapeutic Activities (CPT 09980), Therapeutic Exercise (CPT 28598) and E Stim Unattended (CPT 71193)  Frequency:  2x week  Duration in weeks:  8  Duration in visits:  16      Referring provider co-signature:  I have reviewed this plan of care and my co-signature certifies the need for services.     Certification Period: 07/14/2025 to 09/08/25    Physician Signature: ________________________________ Date: ______________

## 2025-07-14 NOTE — OP THERAPY DAILY TREATMENT
Outpatient Physical Therapy  DAILY TREATMENT     [facility] [place]  [address], [address]  [facility] [address]  Phone:  [phone]  Fax:  [phone]    Date: 07/14/2025    Patient: Bernadette Juárez  YOB: 1980  MRN: [id number]     Time Calculation    Start time: 0730  Stop time: 0815 Time Calculation (min): 45 minutes         Chief Complaint: Back Problem    Visit #: 5    SUBJECTIVE:  Pt reports she has been doing well since last visit. Pt reports she went to the gym x3 since last visit. Pt reports she also bought a new bed which will come in this week. Pt reports doing her HEP and it does help. Pt reports if she does something active during the day she wont have the pain at night. She still reports pain with sitting for longer periods of time.     OBJECTIVE:  Current objective measures: See progress note.           Therapeutic Exercises (CPT 10034):     1. prone on elbows, 3 mins, over pressure with press up x10 by PT NT    2. piriformis stretch, 3x20 secs, ea side    3. TA bracing with reverse marches, 3x8 ea    4. bridges, 3x10 with 5-7 sec holds, on ball (red) SL for HEP    5. clamshells, 3x10, GTB    6. lateral step down, 3x8 ea, 1 riser    7. pallof press, 3x10 ea    8. hamstring stretch, 3x20 secs ea    9. half kneeling hip flexor stretch, 3x20 secs ea, NT    10. bike, 5 mins, L4    11. Shuttle, 3x12, DL 8C    12. Objective measures    13. KB swings, 2x12, #20      Therapeutic Exercise Summary: Pt was given HEP prior to leaving and verbalized understanding.    Access Code: [email or web address]  URL: [email or web address]  Date: 06/10/2025  Prepared by: Rahul Foley    Exercises  - Supine Piriformis Stretch with Foot on Ground  - 1 x daily - 7 x weekly - 1 sets - 5 reps - 20-30 secs hold  - Half Kneeling Hip Flexor Stretch  - 1 x daily - 7 x weekly - 1 sets - 5 reps - 20-30 secs hold  - Prone Press Up  - 1 x daily - 7 x weekly - 2 sets - 10 reps  - Supine  Transversus Abdominis Bracing - Hands on Stomach  - 1 x daily - 7 x weekly - 1 sets - 10 reps      Time-based treatments/modalities:    Physical Therapy Timed Treatment Charges  Therapeutic exercise minutes (CPT 24524): 40 minutes          ASSESSMENT:   Response to treatment: See progress note.     PLAN/RECOMMENDATIONS:   Plan for treatment: therapy treatment to continue next visit.  Planned interventions for next visit: continue with current treatment.

## 2025-07-21 ENCOUNTER — PHYSICAL THERAPY (OUTPATIENT)
Dept: PHYSICAL THERAPY | Facility: REHABILITATION | Age: 45
End: 2025-07-21
Attending: FAMILY MEDICINE
Payer: COMMERCIAL

## 2025-07-21 DIAGNOSIS — M54.50 CHRONIC RIGHT-SIDED LOW BACK PAIN WITHOUT SCIATICA: Primary | ICD-10-CM

## 2025-07-21 DIAGNOSIS — G89.29 CHRONIC RIGHT-SIDED LOW BACK PAIN WITHOUT SCIATICA: Primary | ICD-10-CM

## 2025-07-21 PROCEDURE — 97110 THERAPEUTIC EXERCISES: CPT

## 2025-07-21 NOTE — OP THERAPY DAILY TREATMENT
Outpatient Physical Therapy  DAILY TREATMENT     [facility] Outpatient Physical Therapy [place]  [address]  [facility] [address]  Phone:  [phone]  Fax:  [phone]    Date: 07/21/2025    Patient: Bernadette Juárez  YOB: 1980  MRN: [id number]     Time Calculation    Start time: 0730  Stop time: 0815 Time Calculation (min): 45 minutes         Chief Complaint: Back Problem and Hip Problem    Visit #: 6    SUBJECTIVE:  Pt reports she did drive over to [place] this last week and had some R hip pain but it resolved once she completed some of her exercises. Pt repots the same pain coming home. Pt also reports her new bed is helping a lot as well.      OBJECTIVE:  Current objective measures:           Therapeutic Exercises (CPT 40909):     1. prone on elbows, 3 mins, press up    2. piriformis stretch, 3x20 secs, ea side    3. deadbugs, 3x8 ea    4. bridges, 3x10 with 5-7 sec holds, on ball (red) SL for HEP    5. clamshells, 3x10, side plank    7. pallof press, 3x10 ea    8. hamstring stretch, 3x20 secs ea, NT    9. half kneeling hip flexor stretch, 3x20 secs ea, NT    10. bike, 7 mins, L5    11. Shuttle, 3x12, DL 8C, 3x12 ea, SL 4 C    13. KB swings, 2x12, #20      Therapeutic Exercise Summary: Pt was given HEP prior to leaving and verbalized understanding.    Access Code: [email or web address]  URL: [email or web address]  Date: 06/10/2025  Prepared by: Rahul Foley    Exercises  - Supine Piriformis Stretch with Foot on Ground  - 1 x daily - 7 x weekly - 1 sets - 5 reps - 20-30 secs hold  - Half Kneeling Hip Flexor Stretch  - 1 x daily - 7 x weekly - 1 sets - 5 reps - 20-30 secs hold  - Prone Press Up  - 1 x daily - 7 x weekly - 2 sets - 10 reps  - Supine Transversus Abdominis Bracing - Hands on Stomach  - 1 x daily - 7 x weekly - 1 sets - 10 reps      Time-based treatments/modalities:    Physical Therapy Timed Treatment Charges  Therapeutic exercise minutes (CPT 66203): 40  minutes          ASSESSMENT:   Response to treatment: Pt tolerated treatment well today. Progressed pt abdominal strengthening exercises and increased resistance with all BLE exercises. Pt is reporting exercises decrease pain if she does get pain. Pt will still benefit from skilled physical therapy in order to improve functional activity tolerance.     PLAN/RECOMMENDATIONS:   Plan for treatment: therapy treatment to continue next visit.  Planned interventions for next visit: continue with current treatment.

## 2025-07-28 ENCOUNTER — PHYSICAL THERAPY (OUTPATIENT)
Dept: PHYSICAL THERAPY | Facility: REHABILITATION | Age: 45
End: 2025-07-28
Attending: FAMILY MEDICINE
Payer: COMMERCIAL

## 2025-07-28 DIAGNOSIS — G89.29 CHRONIC RIGHT-SIDED LOW BACK PAIN WITHOUT SCIATICA: Primary | ICD-10-CM

## 2025-07-28 DIAGNOSIS — M54.50 CHRONIC RIGHT-SIDED LOW BACK PAIN WITHOUT SCIATICA: Primary | ICD-10-CM

## 2025-07-28 PROCEDURE — 97110 THERAPEUTIC EXERCISES: CPT

## 2025-07-28 NOTE — OP THERAPY DAILY TREATMENT
Outpatient Physical Therapy  DAILY TREATMENT     [facility] [place]  [address]  [facility] [address]  Phone:  [phone]  Fax:  [phone]    Date: 07/28/2025    Patient: Bernadette Juárez  YOB: 1980  MRN: [id number]     Time Calculation    Start time: 0730  Stop time: 0815 Time Calculation (min): 45 minutes         Chief Complaint: Back Problem    Visit #: 7    SUBJECTIVE:  Pt reports she was having some radiating pain following the last visit down the [place] pt reports it lasted one day then resolved. Pt reports since then it has been good. Pt repots more movement helps.  Pt has also been going to the gym and has had no pain.     OBJECTIVE:  Current objective measures:           Therapeutic Exercises (CPT 96054):     1. prone on elbows, 3 mins, press up    2. piriformis stretch, 3x20 secs, NT    3. deadbugs, 3x8 ea    4. bridges, 3x10 with 5-7 sec holds, on ball (red) SL for HEP    5. clamshells, 3x10, side plank    6. heel drive hamstring curls on ball, 3x12, TrA holds    7. pallof press, 3x10 ea    10. bike, 5 mins, L5    11. Shuttle, 3x12, DL 8C, 2x12 ea, SL 6C    13. KB swings, 2x12, NT    14. offset cairries, 3x20ft ea, #35, #25 and #6      Therapeutic Exercise Summary: Pt was given HEP prior to leaving and verbalized understanding.    Access Code: [email or web address]  URL: [email or web address]  Date: 06/10/2025  Prepared by: Rahul Foley    Exercises  - Supine Piriformis Stretch with Foot on Ground  - 1 x daily - 7 x weekly - 1 sets - 5 reps - 20-30 secs hold  - Half Kneeling Hip Flexor Stretch  - 1 x daily - 7 x weekly - 1 sets - 5 reps - 20-30 secs hold  - Prone Press Up  - 1 x daily - 7 x weekly - 2 sets - 10 reps  - Supine Transversus Abdominis Bracing - Hands on Stomach  - 1 x daily - 7 x weekly - 1 sets - 10 reps      Time-based treatments/modalities:    Physical Therapy Timed Treatment Charges  Therapeutic exercise minutes (CPT 75636): 45  minutes          ASSESSMENT:   Response to treatment: Pt tolerated treatment well today. Progressed BLE strength exercises with vc for TrA engagement with all exercises. Pt is progressing well through POC and will required 2-3 more visits prior to d/c from care. Pt will still benefit from skilled physical therapy in order to improve functional activity tolerance.     PLAN/RECOMMENDATIONS:   Plan for treatment: therapy treatment to continue next visit.  Planned interventions for next visit: continue with current treatment.

## 2025-08-04 ENCOUNTER — PHYSICAL THERAPY (OUTPATIENT)
Dept: PHYSICAL THERAPY | Facility: REHABILITATION | Age: 45
End: 2025-08-04
Attending: FAMILY MEDICINE
Payer: COMMERCIAL

## 2025-08-04 DIAGNOSIS — M54.50 CHRONIC RIGHT-SIDED LOW BACK PAIN WITHOUT SCIATICA: Primary | ICD-10-CM

## 2025-08-04 DIAGNOSIS — G89.29 CHRONIC RIGHT-SIDED LOW BACK PAIN WITHOUT SCIATICA: Primary | ICD-10-CM

## 2025-08-04 PROCEDURE — 97110 THERAPEUTIC EXERCISES: CPT

## 2025-08-18 ENCOUNTER — PHYSICAL THERAPY (OUTPATIENT)
Dept: PHYSICAL THERAPY | Facility: REHABILITATION | Age: 45
End: 2025-08-18
Attending: FAMILY MEDICINE
Payer: COMMERCIAL

## 2025-08-18 DIAGNOSIS — M54.50 CHRONIC RIGHT-SIDED LOW BACK PAIN WITHOUT SCIATICA: Primary | ICD-10-CM

## 2025-08-18 DIAGNOSIS — G89.29 CHRONIC RIGHT-SIDED LOW BACK PAIN WITHOUT SCIATICA: Primary | ICD-10-CM

## 2025-08-18 PROCEDURE — 97110 THERAPEUTIC EXERCISES: CPT
